# Patient Record
Sex: MALE | Race: WHITE | NOT HISPANIC OR LATINO | URBAN - METROPOLITAN AREA
[De-identification: names, ages, dates, MRNs, and addresses within clinical notes are randomized per-mention and may not be internally consistent; named-entity substitution may affect disease eponyms.]

---

## 2018-06-18 ENCOUNTER — TRANSCRIBE ORDERS (OUTPATIENT)
Dept: PHYSICAL THERAPY | Facility: CLINIC | Age: 63
End: 2018-06-18
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
## 2018-06-18 DIAGNOSIS — S16.1XXA STRAIN OF NECK MUSCLE, INITIAL ENCOUNTER: ICD-10-CM

## 2018-06-18 DIAGNOSIS — S23.3XXA SPRAIN OF LIGAMENTS OF THORACIC SPINE, INITIAL ENCOUNTER: ICD-10-CM
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
## 2018-06-18 DIAGNOSIS — S46.912A STRAIN OF UPPER ARM, LEFT, INITIAL ENCOUNTER: Primary | ICD-10-CM

## 2018-08-20 ENCOUNTER — OFFICE VISIT (OUTPATIENT)
Dept: ORTHOPEDIC SURGERY | Facility: CLINIC | Age: 63
End: 2018-08-20

## 2018-08-20 VITALS — OXYGEN SATURATION: 98 % | HEIGHT: 66 IN | HEART RATE: 95 BPM | BODY MASS INDEX: 22.53 KG/M2 | WEIGHT: 140.21 LBS

## 2018-08-20 DIAGNOSIS — M25.512 ACUTE PAIN OF LEFT SHOULDER: Primary | ICD-10-CM

## 2018-08-20 DIAGNOSIS — S46.012A RUPTURE OF LEFT SUPRASPINATUS TENDON, INITIAL ENCOUNTER: ICD-10-CM

## 2018-08-20 PROCEDURE — 99406 BEHAV CHNG SMOKING 3-10 MIN: CPT | Performed by: ORTHOPAEDIC SURGERY

## 2018-08-20 PROCEDURE — 99204 OFFICE O/P NEW MOD 45 MIN: CPT | Performed by: ORTHOPAEDIC SURGERY

## 2018-08-20 RX ORDER — IBUPROFEN 200 MG
200 TABLET ORAL EVERY 6 HOURS PRN
COMMUNITY

## 2018-08-20 NOTE — PROGRESS NOTES
[facility] [place]    Subjective     Chief Complaint   Patient presents with   • Left Shoulder - Pain     Injured at work 08/2017        HPI      Sana Braun is a 62 y.o. male.  She complains of left shoulder pain.  She injured her shoulder at work August 2017.  She has pain in left shoulder and arm with numbness in her left hand.  It is worse with driving lifting and reaching.  She feels better with rest.  Pain is 7 out of 10.  She has been to physical therapy.  She has tried ibuprofen.        History reviewed. No pertinent past medical history.   Past Surgical History:   Procedure Laterality Date   • HYSTERECTOMY        Family History   Problem Relation Age of Onset   • Hypertension Mother    • Stroke Father      Social History     Social History   • Marital status: Unknown     Spouse name: N/A   • Number of children: N/A   • Years of education: N/A     Occupational History   • Not on file.     Social History Main Topics   • Smoking status: Current Some Day Smoker   • Smokeless tobacco: Never Used   • Alcohol use Yes   • Drug use: No   • Sexual activity: Defer     Other Topics Concern   • Not on file     Social History Narrative   • No narrative on file      No current outpatient prescriptions on file prior to visit.     No current facility-administered medications on file prior to visit.       No Known Allergies     The following portions of the patient's history were reviewed and updated as appropriate: allergies, current medications, past family history, past medical history, past social history, past surgical history and problem list.    Review of Systems   Constitutional: Positive for activity change and fatigue.   HENT: Negative.    Eyes: Negative.    Respiratory: Negative.    Cardiovascular: Negative.    Gastrointestinal: Negative.    Endocrine: Negative.    Genitourinary: Negative.    Musculoskeletal: Positive for arthralgias, neck pain and neck stiffness.   Skin: Negative.   "  Allergic/Immunologic: Negative.    Neurological: Negative.    Hematological: Negative.    Psychiatric/Behavioral: Positive for sleep disturbance.        Objective      Physical Exam  Pulse 95   Ht 167.6 cm (66\")   Wt 63.6 kg (140 lb 3.4 oz)   SpO2 98%   BMI 22.63 kg/m²     Body mass index is 22.63 kg/m².        GENERAL APPEARANCE: awake, alert & oriented x 3, in no acute distress and well developed, well nourished  PSYCH: normal mood and affect  LUNGS:  breathing nonlabored, no wheezing  EYES: sclera anicteric, pupils equal  CARDIOVASCULAR: palpable pulses dorsalis pedis, palpable posterior tibial bilaterally. Capillary refill less than 2 seconds  INTEGUMENTARY: skin intact, no clubbing, cyanosis  NEUROLOGIC:  Normal Sensation and reflexes, subjective decrease in sensation at times in the left hand           Ortho Exam  Musculoskeletal   Upper Extremity   Left Shoulder     Inspection and Palpation:     Tenderness - none     Crepitus - none    Sensation is normal    Examination reveals no ecchymosis.      Strength and Tone:    Supraspinatus, Infraspinatus - 4/5    Subscapularis - 5/5    Deltoid - 5/5   Range of Motion   Right shoulder:    Internal Rotation: ROM - T7    External Rotation: AROM - 80 degrees    Elevation through flexion: AROM - 180 degrees     Abduction - 180   Left Shoulder:    Internal Rotation: ROM - T7    External Rotation: AROM - 80 degrees    Elevation through flexion: AROM - 90 degrees     Abduction - 90 degrees   Instability   Left shoulder    Sulcus sign negative    Apprehension test negative    Jelani relocation test negative    Jerk test negative   Impingement   Left shoulder    Carter impingement test positive    Neer impingement test positive   Functional Testing   Left shoulder    AC crossover adduction test negative    Abdominal compression test negative    Lift-off sign negative    Speed's test negative    Navarro's test negative    Horriblower's sign negative "     Imaging/Studies  Imaging Results (last 7 days)     Procedure Component Value Units Date/Time    XR Shoulder 2+ View Left [[id number]] Resulted:  08/20/18 1016     Updated:  08/20/18 1017    Narrative:       Left Shoulder X-Ray  Indication: Pain  AP, scapular Y, and axillary lateral views    Findings: Increased ossification about greater tuberosity consistent with   possible old healed fracture or calcific tendinitis  No fracture  No bony lesion  Normal soft tissues  Normal joint spaces    No prior studies were available for comparison.          I reviewed the MRI report which shows a small full-thickness tear of the supraspinatus.  However I do not have these images to review myself and she will bring the disc back tomorrow for me to see the pictures    Assessment/Plan        ICD-10-CM ICD-9-CM   1. Acute pain of left shoulder M25.512 719.41   2. Rupture of left supraspinatus tendon, initial encounter S46.012A 840.6       Orders Placed This Encounter   Procedures   • XR Shoulder 2+ View Left      The plan will be for a left shoulder arthroscopy with rotator cuff repair.  She'll continue restrictions of no use left arm or off work.  She will bring the disc for me to review her MRI tomorrow.Treatment options and alternatives were discussed with patient.  Surgical versus non surgical treatment options were discussed as well.    We reviewed the nature of the planned procedure including the risks, benefits and potential complications.  Understanding was expressed and the decision was made to proceed with surgery.  I discussed the importance of smoking cessation.  I spent 3 minutes with the patient discussing smoking cessation. I spent approximately 3 minutes counseling the patient regarding the adverse effects of tobacco use.  Included in this counseling session were treatment options for cessation including quitting cold turkey, medication, nicotine step-down programs, and smoking cessation programs.  Furthermore, I  explained to the patient the importance of abstaining from nicotine usage as nicotine is known to increase the risk of complications associated with surgery including but not limited to infection, decreased wound healing, slowed soft tissue healing, and increased surgery associated pain. I explained the risk of smoking, including hardening of the arteries.    Medical Decision Making  Management Options : over-the-counter medicine and major surgery with risk factors  Data/Risk: radiology tests and independent visualization of imaging, lab tests, or EMG/NCV    Tony Chavez MD  08/20/18  10:29 AM         EMR Dragon/Transcription disclaimer:  Much of this encounter note is an electronic transcription of spoken language to printed text. Electronic transcription of spoken language may permit erroneous, or at times, nonsensical words or phrases to be inadvertently transcribed. Although I have reviewed the note for such errors, some may still exist.

## 2018-09-12 ENCOUNTER — TELEPHONE (OUTPATIENT)
Dept: ORTHOPEDIC SURGERY | Facility: CLINIC | Age: 63
End: 2018-09-12

## 2018-09-12 NOTE — TELEPHONE ENCOUNTER
CALLED PATIENT TO ADVISE OF 6:30AM ARRIVAL TIME FOR SURGERY ON 9/13/18 WITH DR. FARMER. NO ANSWER, LEFT VOICEMAIL WITH DETAILS AND REQUEST TO RETURN CALL CONFIRMING RECEIPT OF MESSAGE.

## 2018-09-13 ENCOUNTER — OUTSIDE FACILITY SERVICE (OUTPATIENT)
Dept: ORTHOPEDIC SURGERY | Facility: CLINIC | Age: 63
End: 2018-09-13

## 2018-09-13 PROCEDURE — 29827 SHO ARTHRS SRG RT8TR CUF RPR: CPT | Performed by: ORTHOPAEDIC SURGERY

## 2018-09-13 PROCEDURE — 29828 SHO ARTHRS SRG BICP TENODSIS: CPT | Performed by: ORTHOPAEDIC SURGERY

## 2018-09-24 ENCOUNTER — OFFICE VISIT (OUTPATIENT)
Dept: ORTHOPEDIC SURGERY | Facility: CLINIC | Age: 63
End: 2018-09-24

## 2018-09-24 DIAGNOSIS — Z98.890 STATUS POST LEFT ROTATOR CUFF REPAIR: Primary | ICD-10-CM

## 2018-09-24 PROCEDURE — 99024 POSTOP FOLLOW-UP VISIT: CPT | Performed by: ORTHOPAEDIC SURGERY

## 2018-09-24 NOTE — PROGRESS NOTES
Chief Complaint   Patient presents with   • Post-op     2 weeks status post left shoulder arthroscopy rotator cuff repair with biceps tenodesis 09/13/18           HPI    She is doing well follow-up left shoulder rotator cuff repair from September 13.    There were no vitals filed for this visit.      Physical Exam:  The portals look good.  She is neurovascular intact.      X-RAY REPORT:  Imaging Results (last 7 days)     ** No results found for the last 168 hours. **                ICD-10-CM ICD-9-CM   1. Status post left rotator cuff repair Z98.890 V45.89     She's doing well.  She'll continue the sling.  She'll follow-up in 2 weeks to start physical therapy.  She's work restrictions no use left arm.

## 2018-10-10 ENCOUNTER — OFFICE VISIT (OUTPATIENT)
Dept: ORTHOPEDIC SURGERY | Facility: CLINIC | Age: 63
End: 2018-10-10

## 2018-10-10 DIAGNOSIS — Z98.890 STATUS POST LEFT ROTATOR CUFF REPAIR: Primary | ICD-10-CM

## 2018-10-10 DIAGNOSIS — Z02.6 ENCOUNTER RELATED TO WORKER'S COMPENSATION CLAIM: ICD-10-CM

## 2018-10-10 PROCEDURE — 99024 POSTOP FOLLOW-UP VISIT: CPT | Performed by: ORTHOPAEDIC SURGERY

## 2018-10-10 NOTE — PROGRESS NOTES
Chief Complaint   Patient presents with   • Post-op     2 week f/u; 3 weeks status post Left Shoulder Arthroscopy Rotator Cuff Repair with Biceps Tenodesis 09/13/18           HPI  She's doing well one month out from left shoulder rotator cuff repair.  She is worker's comp.      There were no vitals filed for this visit.      Physical Exam:    Her portals look good.  She is neurovascular intact.        ICD-10-CM ICD-9-CM   1. Status post left rotator cuff repair Z98.890 V45.89   2. Encounter related to worker's compensation claim Z02.6 V70.3       Orders Placed This Encounter   Procedures   • Ambulatory Referral to Physical Therapy   She will start physical therapy.  She is on work restrictions no use left arm.  She'll follow-up in one month.

## 2018-11-14 ENCOUNTER — OFFICE VISIT (OUTPATIENT)
Dept: ORTHOPEDIC SURGERY | Facility: CLINIC | Age: 63
End: 2018-11-14

## 2018-11-14 DIAGNOSIS — M75.02 ADHESIVE CAPSULITIS OF LEFT SHOULDER: ICD-10-CM

## 2018-11-14 DIAGNOSIS — Z98.890 STATUS POST LEFT ROTATOR CUFF REPAIR: Primary | ICD-10-CM

## 2018-11-14 DIAGNOSIS — Z02.6 ENCOUNTER RELATED TO WORKER'S COMPENSATION CLAIM: ICD-10-CM

## 2018-11-14 PROCEDURE — 99024 POSTOP FOLLOW-UP VISIT: CPT | Performed by: ORTHOPAEDIC SURGERY

## 2018-11-14 NOTE — PROGRESS NOTES
Chief Complaint   Patient presents with   • Post-op     5 weeks - status post Left Shoulder Arthroscopy Rotator Cuff Repair with Biceps Tenodesis 09/13/18           HPI  She is follow-up left shoulder cuff repair from September 13.  She is not yet been to physical therapy.  Worker's Comp. has delayed itand she does not start until next week.  Therefore she's having increasing pain and stiffness.      There were no vitals filed for this visit.      Physical Exam:  She has very limited for flexion and abduction less than 20°.  She is distally neurovascularly intact.      X-RAY REPORT:  Imaging Results (last 7 days)     ** No results found for the last 168 hours. **                ICD-10-CM ICD-9-CM   1. Status post left rotator cuff repair Z98.890 V45.89   2. Encounter related to worker's compensation claim Z02.6 V70.3   3. Adhesive capsulitis of left shoulder M75.02 726.0       Orders Placed This Encounter   Procedures   • Ambulatory Referral to Physical Therapy   She desperately needs physical therapy.  She will continue anti-inflammatories.  She'll follow-up in one month.  She is work restrictions no use left arm.

## 2018-12-03 ENCOUNTER — TELEPHONE (OUTPATIENT)
Dept: ORTHOPEDIC SURGERY | Facility: CLINIC | Age: 63
End: 2018-12-03

## 2018-12-03 NOTE — TELEPHONE ENCOUNTER
I left a voice message and let the patient know that I faxed her therapy order to the number she left fax #[phone].

## 2018-12-04 ENCOUNTER — TELEPHONE (OUTPATIENT)
Dept: ORTHOPEDIC SURGERY | Facility: CLINIC | Age: 63
End: 2018-12-04

## 2018-12-04 DIAGNOSIS — Z98.890 H/O REPAIR OF LEFT ROTATOR CUFF: Primary | ICD-10-CM

## 2018-12-04 NOTE — TELEPHONE ENCOUNTER
PATIENT CALLED REQUESTING NEW PT ORDER, SHE IS CURRENTLY OUT OF VISITS. SHE NEEDS IT TO START ON 12/7 NOT 11/14. CAN CALL [phone]. FAX [phone].

## 2018-12-19 ENCOUNTER — OFFICE VISIT (OUTPATIENT)
Dept: ORTHOPEDIC SURGERY | Facility: CLINIC | Age: 63
End: 2018-12-19

## 2018-12-19 VITALS — OXYGEN SATURATION: 99 % | WEIGHT: 147.49 LBS | HEIGHT: 66 IN | HEART RATE: 112 BPM | BODY MASS INDEX: 23.7 KG/M2

## 2018-12-19 DIAGNOSIS — Z02.6 ENCOUNTER RELATED TO WORKER'S COMPENSATION CLAIM: ICD-10-CM

## 2018-12-19 DIAGNOSIS — Z98.890 H/O REPAIR OF LEFT ROTATOR CUFF: Primary | ICD-10-CM

## 2018-12-19 DIAGNOSIS — M75.02 ADHESIVE CAPSULITIS OF LEFT SHOULDER: ICD-10-CM

## 2018-12-19 PROCEDURE — 99213 OFFICE O/P EST LOW 20 MIN: CPT | Performed by: ORTHOPAEDIC SURGERY

## 2018-12-19 RX ORDER — FLUTICASONE PROPIONATE 50 MCG
1 SPRAY, SUSPENSION (ML) NASAL 2 TIMES DAILY
Refills: 0 | COMMUNITY
Start: 2018-12-03

## 2018-12-19 RX ORDER — CEFDINIR 300 MG/1
CAPSULE ORAL
Refills: 0 | COMMUNITY
Start: 2018-12-03 | End: 2019-04-24

## 2018-12-19 NOTE — PROGRESS NOTES
[facility] [place]    Subjective     Chief Complaint   Patient presents with   • Left Shoulder - Follow-up     Left Shoulder Arthroscopy Rotator Cuff Repair with Biceps Tenodesis 09/13/18  1 month f/u        HPI      Sana Braun is a 63 y.o. male.  Had a left shoulder cuff repair with biceps tenodesis September 13.  She only got 6 sessions of physical therapy.  Her Worker's Comp. is then completely unreasonable.  They finally approved therapy she had 6 sessions and then none and resumed therapy yesterday.  I reviewed the notes from physical therapist and she regressed because she was unable to get therapy for a significant period of time. In my 16 years of practice I have  never had a Worker's Comp. reapprove 6 sessions of physical therapy        History reviewed. No pertinent past medical history.   Past Surgical History:   Procedure Laterality Date   • HYSTERECTOMY     • SHOULDER SURGERY Left 09/13/2018    Left shoulder arthroscopy with rotator cuff repair and biceps tenodesis ; Dr. Tony Chavez, Orthopedic Surgery      Family History   Problem Relation Age of Onset   • Hypertension Mother    • Stroke Father      Social History     Socioeconomic History   • Marital status: Unknown     Spouse name: Not on file   • Number of children: Not on file   • Years of education: Not on file   • Highest education level: Not on file   Social Needs   • Financial resource strain: Not on file   • Food insecurity - worry: Not on file   • Food insecurity - inability: Not on file   • Transportation needs - medical: Not on file   • Transportation needs - non-medical: Not on file   Occupational History   • Not on file   Tobacco Use   • Smoking status: Current Some Day Smoker   • Smokeless tobacco: Never Used   Substance and Sexual Activity   • Alcohol use: Yes   • Drug use: No   • Sexual activity: Defer   Other Topics Concern   • Not on file   Social History Narrative   • Not on file      Current  "Outpatient Medications on File Prior to Visit   Medication Sig Dispense Refill   • cefdinir (OMNICEF) 300 MG capsule take 1 capsule by mouth twice a day for 10 days  0   • Etanercept (ENBREL) 25 MG reconstituted solution Inject 5 mL under the skin into the appropriate area as directed.     • fluticasone (FLONASE) 50 MCG/ACT nasal spray 1 spray by Each Nare route 2 (Two) Times a Day.  0   • ibuprofen (ADVIL,MOTRIN) 200 MG tablet Take 200 mg by mouth Every 6 (Six) Hours As Needed for Mild Pain .       No current facility-administered medications on file prior to visit.       No Known Allergies     The following portions of the patient's history were reviewed and updated as appropriate: allergies, current medications, past family history, past medical history, past social history, past surgical history and problem list.    Review of Systems   Constitutional: Negative.    HENT: Negative.    Eyes: Negative.    Respiratory: Negative.    Cardiovascular: Negative.    Gastrointestinal: Negative.    Endocrine: Negative.    Genitourinary: Negative.    Musculoskeletal: Positive for back pain, joint swelling, neck pain and neck stiffness.   Skin: Negative.    Allergic/Immunologic: Negative.    Neurological: Negative.    Hematological: Negative.    Psychiatric/Behavioral: Negative.         Objective      Physical Exam  Pulse 112   Ht 167.6 cm (65.98\")   Wt 66.9 kg (147 lb 7.8 oz)   SpO2 99%   BMI 23.82 kg/m²     Body mass index is 23.82 kg/m².        GENERAL APPEARANCE: awake, alert & oriented x 3, in no acute distress and well developed, well nourished  PSYCH: normal mood and affect        Ortho Exam  Left shoulder passive range of motion 110° I 5° abduction 65° external rotation 70° internal rotation active motion 82° flexion and 48° abduction    Imaging/Studies  Imaging Results (last 7 days)     ** No results found for the last 168 hours. **          Assessment/Plan        ICD-10-CM ICD-9-CM   1. H/O repair of left rotator " cuff Z98.890 V15.29   2. Encounter related to worker's compensation claim Z02.6 V70.3   3. Adhesive capsulitis of left shoulder M75.02 726.0       Orders Placed This Encounter   Procedures   • Ambulatory Referral to Physical Therapy    I have ordered more physical therapy.  She needs 3 times a week for probably 12 weeks at this point.  She was made worse by the fact she was unable to get physical therapy.  She has regressed.  She has a frozen shoulder now.  Her work restriction is no use left arm.  I'll see her back in 1 month.  I reviewed the notes from her physical therapist dated 12/18 and in summary the patient has regressed.    Medical Decision Making  Management Options : over-the-counter medicine and physical/occupational therapy  Data/Risk: radiology tests, summary of old records and independent visualization of imaging, lab tests, or EMG/NCV    Tony Chavez MD  12/19/18  4:10 PM         EMR Dragon/Transcription disclaimer:  Much of this encounter note is an electronic transcription of spoken language to printed text. Electronic transcription of spoken language may permit erroneous, or at times, nonsensical words or phrases to be inadvertently transcribed. Although I have reviewed the note for such errors, some may still exist.

## 2019-01-14 ENCOUNTER — OFFICE VISIT (OUTPATIENT)
Dept: ORTHOPEDIC SURGERY | Facility: CLINIC | Age: 64
End: 2019-01-14

## 2019-01-14 VITALS — HEIGHT: 66 IN | OXYGEN SATURATION: 99 % | BODY MASS INDEX: 24.55 KG/M2 | WEIGHT: 152.78 LBS | HEART RATE: 103 BPM

## 2019-01-14 DIAGNOSIS — Z02.6 ENCOUNTER RELATED TO WORKER'S COMPENSATION CLAIM: ICD-10-CM

## 2019-01-14 DIAGNOSIS — Z98.890 H/O REPAIR OF LEFT ROTATOR CUFF: Primary | ICD-10-CM

## 2019-01-14 DIAGNOSIS — M75.02 ADHESIVE CAPSULITIS OF LEFT SHOULDER: ICD-10-CM

## 2019-01-14 PROCEDURE — 99213 OFFICE O/P EST LOW 20 MIN: CPT | Performed by: ORTHOPAEDIC SURGERY

## 2019-01-14 NOTE — PROGRESS NOTES
[facility] [place] [facility]    Subjective     Chief Complaint   Patient presents with   • Left Shoulder - Follow-up     H/O Repair of Left Rotator Cuff  1 month f/u        HPI      Sana Braun is a 63 y.o. male.  She is follow-up left shoulder cuff repair with biceps tenodesis from September 13, 2018.  She had a delay in physical therapy.  [place]'s [place].  There have been issues.  I have records that are reviewed from her physical therapist.        History reviewed. No pertinent past medical history.   Past Surgical History:   Procedure Laterality Date   • HYSTERECTOMY     • SHOULDER SURGERY Left 09/13/2018    Left shoulder arthroscopy with rotator cuff repair and biceps tenodesis ; Dr. Tony Chavez, Orthopedic Surgery      Family History   Problem Relation Age of Onset   • Hypertension Mother    • Stroke Father      Social History     Socioeconomic History   • Marital status: Unknown     Spouse name: Not on file   • Number of children: Not on file   • Years of education: Not on file   • Highest education level: Not on file   Social Needs   • Financial resource strain: Not on file   • Food insecurity - worry: Not on file   • Food insecurity - inability: Not on file   • Transportation needs - medical: Not on file   • Transportation needs - non-medical: Not on file   Occupational History   • Not on file   Tobacco Use   • Smoking status: Current Some Day Smoker   • Smokeless tobacco: Never Used   Substance and Sexual Activity   • Alcohol use: Yes   • Drug use: No   • Sexual activity: Defer   Other Topics Concern   • Not on file   Social History Narrative   • Not on file      Current Outpatient Medications on File Prior to Visit   Medication Sig Dispense Refill   • cefdinir (OMNICEF) 300 MG capsule take 1 capsule by mouth twice a day for 10 days  0   • Etanercept (ENBREL) 25 MG reconstituted solution Inject 5 mL under the skin into the appropriate area as directed.     •  "fluticasone (FLONASE) 50 MCG/ACT nasal spray 1 spray by Each Nare route 2 (Two) Times a Day.  0   • ibuprofen (ADVIL,MOTRIN) 200 MG tablet Take 200 mg by mouth Every 6 (Six) Hours As Needed for Mild Pain .       No current facility-administered medications on file prior to visit.       No Known Allergies     The following portions of the patient's history were reviewed and updated as appropriate: allergies, current medications, past family history, past medical history, past social history, past surgical history and problem list.    Review of Systems   Constitutional: Negative.    HENT: Negative.    Eyes: Negative.    Respiratory: Negative.    Cardiovascular: Negative.    Gastrointestinal: Negative.    Endocrine: Negative.    Genitourinary: Negative.    Musculoskeletal: Positive for joint swelling.   Skin: Negative.    Allergic/Immunologic: Negative.    Neurological: Negative.    Hematological: Negative.    Psychiatric/Behavioral: Negative.         Objective      Physical Exam  Pulse 103   Ht 167.6 cm (65.98\")   Wt 69.3 kg (152 lb 12.5 oz)   SpO2 99%   BMI 24.67 kg/m²     Body mass index is 24.67 kg/m².        GENERAL APPEARANCE: awake, alert & oriented x 3, in no acute distress and well developed, well nourished  PSYCH: normal mood and affect      Ortho Exam  I agree with the readings from her physical therapist.  Her passive range of motion left shoulder 163° flexion 130° abduction 80 external rotation 80 internal rotation actively 113° less than 90° abduction    Imaging/Studies  Imaging Results (last 7 days)     ** No results found for the last 168 hours. **          Assessment/Plan        ICD-10-CM ICD-9-CM   1. H/O repair of left rotator cuff Z98.890 V15.29   2. Encounter related to worker's compensation claim Z02.6 V70.3   3. Adhesive capsulitis of left shoulder M75.02 726.0     She has 3 more weeks of physical therapy and I will see her back and then if she is not better we'll get an MRI.  I reviewed her " notes from [facility] dated January 14.  I summarized those in the physical exam portion of my note  Medical Decision Making  Management Options : over-the-counter medicine and physical/occupational therapy      Tony Chavez MD  01/14/19  4:08 PM         EMR Dragon/Transcription disclaimer:  Much of this encounter note is an electronic transcription of spoken language to printed text. Electronic transcription of spoken language may permit erroneous, or at times, nonsensical words or phrases to be inadvertently transcribed. Although I have reviewed the note for such errors, some may still exist.

## 2019-02-11 ENCOUNTER — OFFICE VISIT (OUTPATIENT)
Dept: ORTHOPEDIC SURGERY | Facility: CLINIC | Age: 64
End: 2019-02-11

## 2019-02-11 VITALS — HEART RATE: 111 BPM | OXYGEN SATURATION: 90 % | HEIGHT: 66 IN | WEIGHT: 149.91 LBS | BODY MASS INDEX: 24.09 KG/M2

## 2019-02-11 DIAGNOSIS — Z02.6 ENCOUNTER RELATED TO WORKER'S COMPENSATION CLAIM: ICD-10-CM

## 2019-02-11 DIAGNOSIS — Z98.890 H/O REPAIR OF LEFT ROTATOR CUFF: Primary | ICD-10-CM

## 2019-02-11 DIAGNOSIS — M75.02 ADHESIVE CAPSULITIS OF LEFT SHOULDER: ICD-10-CM

## 2019-02-11 PROCEDURE — 99213 OFFICE O/P EST LOW 20 MIN: CPT | Performed by: ORTHOPAEDIC SURGERY

## 2019-02-11 NOTE — PROGRESS NOTES
[facility] [place]    Subjective     Chief Complaint   Patient presents with   • Left Shoulder - Follow-up     4 weeks        HPI      Sana Braun is a 63 y.o. male.  She is follow-up left shoulder cuff repair from September 13.  I reviewed her physical therapy report from February 4 and she has regressed in summary.  Her range of motion is only 100° flexion 55 abduction active        History reviewed. No pertinent past medical history.   Past Surgical History:   Procedure Laterality Date   • HYSTERECTOMY     • SHOULDER SURGERY Left 09/13/2018    Left shoulder arthroscopy with rotator cuff repair and biceps tenodesis ; Dr. Tony Chavez, Orthopedic Surgery      Family History   Problem Relation Age of Onset   • Hypertension Mother    • Stroke Father      Social History     Socioeconomic History   • Marital status: Unknown     Spouse name: Not on file   • Number of children: Not on file   • Years of education: Not on file   • Highest education level: Not on file   Social Needs   • Financial resource strain: Not on file   • Food insecurity - worry: Not on file   • Food insecurity - inability: Not on file   • Transportation needs - medical: Not on file   • Transportation needs - non-medical: Not on file   Occupational History   • Not on file   Tobacco Use   • Smoking status: Current Some Day Smoker   • Smokeless tobacco: Never Used   Substance and Sexual Activity   • Alcohol use: Yes   • Drug use: No   • Sexual activity: Defer   Other Topics Concern   • Not on file   Social History Narrative   • Not on file      Current Outpatient Medications on File Prior to Visit   Medication Sig Dispense Refill   • cefdinir (OMNICEF) 300 MG capsule take 1 capsule by mouth twice a day for 10 days  0   • Etanercept (ENBREL) 25 MG reconstituted solution Inject 5 mL under the skin into the appropriate area as directed.     • fluticasone (FLONASE) 50 MCG/ACT nasal spray 1 spray by Each Nare route 2 (Two)  "Times a Day.  0   • ibuprofen (ADVIL,MOTRIN) 200 MG tablet Take 200 mg by mouth Every 6 (Six) Hours As Needed for Mild Pain .       No current facility-administered medications on file prior to visit.       No Known Allergies     The following portions of the patient's history were reviewed and updated as appropriate: allergies, current medications, past family history, past medical history, past social history, past surgical history and problem list.    Review of Systems   Constitutional: Negative.    HENT: Negative.    Eyes: Negative.    Respiratory: Negative.    Cardiovascular: Negative.    Gastrointestinal: Negative.    Endocrine: Negative.    Genitourinary: Negative.    Musculoskeletal: Positive for arthralgias, neck pain and neck stiffness.   Skin: Negative.    Allergic/Immunologic: Negative.    Neurological: Negative.    Hematological: Negative.    Psychiatric/Behavioral: Negative.         Objective      Physical Exam  Pulse 111   Ht 167.6 cm (65.98\")   Wt 68 kg (149 lb 14.6 oz)   SpO2 90%   BMI 24.21 kg/m²     Body mass index is 24.21 kg/m².        GENERAL APPEARANCE: awake, alert & oriented x 3, in no acute distress and well developed, well nourished  PSYCH: normal mood and affect  LUNGS:  breathing nonlabored, no wheezing      Ortho Exam  Left shoulder active range of motion 100° flexion 55 abduction passive motion 128 flexion and 105 abduction 55° external rotation 75 internal rotation strength is 4-5    Imaging/Studies  Imaging Results (last 7 days)     ** No results found for the last 168 hours. **          Assessment/Plan        ICD-10-CM ICD-9-CM   1. H/O repair of left rotator cuff Z98.890 V15.29   2. Encounter related to worker's compensation claim Z02.6 V70.3   3. Adhesive capsulitis of left shoulder M75.02 726.0       Orders Placed This Encounter   Procedures   • Ambulatory Referral to Physical Therapy    She has regressed and gotten worse with her range of motion left shoulder.  There was a " [facility] physical therapy.  She had a Worker's Comp. authorization issues.  She has now been in a sessions of therapy and is getting worse.  Therefore we will get the MRI and I'll see her back.  Her work restrictions are no use left arm.    Medical Decision Making  Management Options : over-the-counter medicine and physical/occupational therapy  Data/Risk: summary of old records    Tony Chavez MD  02/11/19  3:49 PM         EMR Dragon/Transcription disclaimer:  Much of this encounter note is an electronic transcription of spoken language to printed text. Electronic transcription of spoken language may permit erroneous, or at times, nonsensical words or phrases to be inadvertently transcribed. Although I have reviewed the note for such errors, some may still exist.

## 2019-03-04 DIAGNOSIS — M25.512 CHRONIC LEFT SHOULDER PAIN: Primary | ICD-10-CM

## 2019-03-04 DIAGNOSIS — G89.29 CHRONIC LEFT SHOULDER PAIN: Primary | ICD-10-CM

## 2019-03-15 DIAGNOSIS — M25.512 CHRONIC LEFT SHOULDER PAIN: ICD-10-CM

## 2019-03-15 DIAGNOSIS — G89.29 CHRONIC LEFT SHOULDER PAIN: ICD-10-CM

## 2019-03-18 ENCOUNTER — OFFICE VISIT (OUTPATIENT)
Dept: ORTHOPEDIC SURGERY | Facility: CLINIC | Age: 64
End: 2019-03-18

## 2019-03-18 VITALS — WEIGHT: 149.91 LBS | OXYGEN SATURATION: 99 % | HEIGHT: 66 IN | BODY MASS INDEX: 24.09 KG/M2 | HEART RATE: 120 BPM

## 2019-03-18 DIAGNOSIS — Z02.6 ENCOUNTER RELATED TO WORKER'S COMPENSATION CLAIM: Primary | ICD-10-CM

## 2019-03-18 DIAGNOSIS — S46.012S RUPTURE OF LEFT SUPRASPINATUS TENDON, SEQUELA: ICD-10-CM

## 2019-03-18 DIAGNOSIS — F17.200 SMOKER UNMOTIVATED TO QUIT: ICD-10-CM

## 2019-03-18 DIAGNOSIS — Z98.890 STATUS POST LEFT ROTATOR CUFF REPAIR: ICD-10-CM

## 2019-03-18 DIAGNOSIS — M75.02 ADHESIVE CAPSULITIS OF LEFT SHOULDER: ICD-10-CM

## 2019-03-18 PROCEDURE — 99406 BEHAV CHNG SMOKING 3-10 MIN: CPT | Performed by: ORTHOPAEDIC SURGERY

## 2019-03-18 PROCEDURE — 99213 OFFICE O/P EST LOW 20 MIN: CPT | Performed by: ORTHOPAEDIC SURGERY

## 2019-03-18 NOTE — PROGRESS NOTES
[facility] [place] [facility] Note    Subjective     Chief Complaint   Patient presents with   • Follow-up     Left shoulder MRI 3-14-19        HPI      Sana Braun is a 63 y.o. male.  She is follow-up left shoulder.  She had rotator cuff repair in September.  She started getting worse and physical therapy in February.  She had a recent MRI.  I have the physical therapy report from [place] dated March 1 which says she has regressed.  Her passive motion is 165 degrees flexion 120 degrees abduction.  Her pain is constant.  She is workers comp.        History reviewed. No pertinent past medical history.   Past Surgical History:   Procedure Laterality Date   • HYSTERECTOMY     • SHOULDER SURGERY Left 09/13/2018    Left shoulder arthroscopy with rotator cuff repair and biceps tenodesis ; Dr. Tony Chavez, Orthopedic Surgery      Family History   Problem Relation Age of Onset   • Hypertension Mother    • Stroke Father      Social History     Socioeconomic History   • Marital status: Unknown     Spouse name: Not on file   • Number of children: Not on file   • Years of education: Not on file   • Highest education level: Not on file   Social Needs   • Financial resource strain: Not on file   • Food insecurity - worry: Not on file   • Food insecurity - inability: Not on file   • Transportation needs - medical: Not on file   • Transportation needs - non-medical: Not on file   Occupational History   • Not on file   Tobacco Use   • Smoking status: Current Some Day Smoker   • Smokeless tobacco: Never Used   Substance and Sexual Activity   • Alcohol use: Yes   • Drug use: No   • Sexual activity: Defer   Other Topics Concern   • Not on file   Social History Narrative   • Not on file      Current Outpatient Medications on File Prior to Visit   Medication Sig Dispense Refill   • cefdinir (OMNICEF) 300 MG capsule take 1 capsule by mouth twice a day for 10 days  0   • Etanercept (ENBREL) 25 MG reconstituted solution  "Inject 5 mL under the skin into the appropriate area as directed.     • fluticasone (FLONASE) 50 MCG/ACT nasal spray 1 spray by Each Nare route 2 (Two) Times a Day.  0   • ibuprofen (ADVIL,MOTRIN) 200 MG tablet Take 200 mg by mouth Every 6 (Six) Hours As Needed for Mild Pain .       No current facility-administered medications on file prior to visit.       No Known Allergies     The following portions of the patient's history were reviewed and updated as appropriate: allergies, current medications, past family history, past medical history, past social history, past surgical history and problem list.    Review of Systems   Constitutional: Negative.    HENT: Negative.    Eyes: Negative.    Respiratory: Negative.    Cardiovascular: Negative.    Gastrointestinal: Negative.    Endocrine: Negative.    Genitourinary: Negative.    Musculoskeletal: Positive for arthralgias (shoulder pain).   Skin: Negative.    Allergic/Immunologic: Negative.    Neurological: Negative.    Hematological: Negative.    Psychiatric/Behavioral: Negative.         Objective      Physical Exam  Pulse 120   Ht 167.6 cm (65.98\")   Wt 68 kg (149 lb 14.6 oz)   SpO2 99%   BMI 24.21 kg/m²     Body mass index is 24.21 kg/m².        GENERAL APPEARANCE: awake, alert & oriented x 3, in no acute distress and well developed, well nourished  PSYCH: normal mood and affect    Ortho Exam  Left shoulder rotator cuff is weak.  She lacks motion.  Active motion is 170 degrees flexion 58 abduction passive 165 and 120.  She is distally neurovascular intact.    Imaging/Studies  Imaging Results (last 7 days)     ** No results found for the last 168 hours. **      I do not have the images but the MRI report from March 14 shows a full-thickness rotator cuff tear similar to prior exam    Assessment/Plan        ICD-10-CM ICD-9-CM   1. Encounter related to worker's compensation claim Z02.6 V70.3   2. Status post left rotator cuff repair Z98.890 V45.89   3. Adhesive " capsulitis of left shoulder M75.02 726.0   4. Rupture of left supraspinatus tendon, sequela S46.012S 905.8   5. Smoker unmotivated to quit F17.200 305.1       Orders Placed This Encounter   Procedures   • Ambulatory Referral to Physical Therapy      I would recommend revision left shoulder rotator cuff repair because she is still symptomatic.  She has a high failure rate risk because she is a smoker and 63 years of age.  She failed previous surgery.  She wants to think about it.  She will continue restrictions no use left arm.  She will follow-up in 1 month.  I spent 3 minutes with the patient discussing smoking cessation. I spent approximately 3 minutes counseling the patient regarding the adverse effects of tobacco use.  Included in this counseling session were treatment options for cessation including quitting cold turkey, medication, nicotine step-down programs, and smoking cessation programs.  Furthermore, I explained to the patient the importance of abstaining from nicotine usage as nicotine is known to increase the risk of complications associated with surgery including but not limited to infection, decreased wound healing, slowed soft tissue healing, and increased surgery associated pain. I explained the risk of smoking, including hardening of the arteries,  gangrene, amputation.  I explained smoking poisons bone cells and increases the risk of nonunion of fractures and fusions.  I explained that smokers are also an increased risk of tendinitis and it is more difficult to treat and resolve.  Medical Decision Making  Management Options : over-the-counter medicine and physical/occupational therapy  Data/Risk: radiology tests    Tony Chavez MD  03/18/19  3:19 PM         EMR Dragon/Transcription disclaimer:  Much of this encounter note is an electronic transcription of spoken language to printed text. Electronic transcription of spoken language may permit erroneous, or at times, nonsensical words or  phrases to be inadvertently transcribed. Although I have reviewed the note for such errors, some may still exist.

## 2019-04-24 ENCOUNTER — OFFICE VISIT (OUTPATIENT)
Dept: ORTHOPEDIC SURGERY | Facility: CLINIC | Age: 64
End: 2019-04-24

## 2019-04-24 VITALS — HEART RATE: 115 BPM | WEIGHT: 156.53 LBS | HEIGHT: 66 IN | BODY MASS INDEX: 25.16 KG/M2 | OXYGEN SATURATION: 99 %

## 2019-04-24 DIAGNOSIS — M75.02 ADHESIVE CAPSULITIS OF LEFT SHOULDER: ICD-10-CM

## 2019-04-24 DIAGNOSIS — Z02.6 ENCOUNTER RELATED TO WORKER'S COMPENSATION CLAIM: Primary | ICD-10-CM

## 2019-04-24 DIAGNOSIS — Z98.890 STATUS POST LEFT ROTATOR CUFF REPAIR: ICD-10-CM

## 2019-04-24 PROCEDURE — 99214 OFFICE O/P EST MOD 30 MIN: CPT | Performed by: ORTHOPAEDIC SURGERY

## 2019-04-24 NOTE — PROGRESS NOTES
[facility] [place] [facility] Note    Subjective     Chief Complaint   Patient presents with   • Left Shoulder - Follow-up     5 week f/u  Adhesive capsulitis of left shoulder;Rupture of left supraspinatus tendon             HPI      Sana Braun is a 63 y.o. male.  She is follow-up left shoulder.  She is not getting better physical therapy.  She is here to discuss possible surgery.  She is here with a .  I reviewed her physical therapy notes from April 15 which described active motion 90 degrees flexion 65 abduction and standing passive motion 120 flexion 104 abduction 65 degrees external rotation and 70 degrees internal rotation        History reviewed. No pertinent past medical history.   Past Surgical History:   Procedure Laterality Date   • HYSTERECTOMY     • SHOULDER SURGERY Left 09/13/2018    Left shoulder arthroscopy with rotator cuff repair and biceps tenodesis ; Dr. Tony Chavez, Orthopedic Surgery      Family History   Problem Relation Age of Onset   • Hypertension Mother    • Stroke Father      Social History     Socioeconomic History   • Marital status: Unknown     Spouse name: Not on file   • Number of children: Not on file   • Years of education: Not on file   • Highest education level: Not on file   Tobacco Use   • Smoking status: Current Some Day Smoker   • Smokeless tobacco: Never Used   Substance and Sexual Activity   • Alcohol use: Yes   • Drug use: No   • Sexual activity: Defer      Current Outpatient Medications on File Prior to Visit   Medication Sig Dispense Refill   • Etanercept (ENBREL) 25 MG reconstituted solution Inject 5 mL under the skin into the appropriate area as directed.     • fluticasone (FLONASE) 50 MCG/ACT nasal spray 1 spray by Each Nare route 2 (Two) Times a Day.  0   • ibuprofen (ADVIL,MOTRIN) 200 MG tablet Take 200 mg by mouth Every 6 (Six) Hours As Needed for Mild Pain .     • [DISCONTINUED] cefdinir (OMNICEF) 300 MG capsule take 1 capsule by  "mouth twice a day for 10 days  0     No current facility-administered medications on file prior to visit.       No Known Allergies     The following portions of the patient's history were reviewed and updated as appropriate: allergies, current medications, past family history, past medical history, past social history, past surgical history and problem list.    Review of Systems   Constitutional: Negative.    HENT: Negative.    Eyes: Negative.    Respiratory: Negative.    Cardiovascular: Negative.    Gastrointestinal: Negative.    Endocrine: Negative.    Genitourinary: Negative.    Musculoskeletal: Positive for arthralgias.   Skin: Negative.    Allergic/Immunologic: Negative.    Neurological: Negative.    Hematological: Negative.    Psychiatric/Behavioral: Positive for agitation and sleep disturbance.        Objective      Physical Exam  Pulse 115   Ht 167.6 cm (65.98\")   Wt 71 kg (156 lb 8.4 oz)   SpO2 99%   BMI 25.28 kg/m²     Body mass index is 25.28 kg/m².        GENERAL APPEARANCE: awake, alert & oriented x 3, in no acute distress and well developed, well nourished  PSYCH: normal mood and affect        Ortho Exam  active motion 90 degrees flexion 65 abduction and standing passive motion 120 flexion 104 abduction 65 degrees external rotation and 70 degrees internal rotation      Imaging/Studies  Imaging Results (last 7 days)     ** No results found for the last 168 hours. **          Assessment/Plan        ICD-10-CM ICD-9-CM   1. Encounter related to worker's compensation claim Z02.6 V70.3   2. Status post left rotator cuff repair Z98.890 V45.89   3. Adhesive capsulitis of left shoulder M75.02 726.0     I recommended revision left shoulder rotator cuff repair.  She still symptomatic.  She does have a high failure rate because she is a smoker and 63 years of age.  She failed previous surgery.  She did not get physical therapy for over 6 weeks post surgery because of a Worker's Comp. issue.  That issue has " been resolved.Treatment options and alternatives were discussed with patient and family.  Surgical versus non surgical treatment options were discussed as well.    We reviewed the nature of the planned procedure including the risks, benefits and potential complications.  Understanding was expressed and the decision was made to proceed with surgery.    Medical Decision Making  Management Options : over-the-counter medicine and major surgery with risk factors      Tony Chavez MD  04/24/19  4:15 PM         EMR Dragon/Transcription disclaimer:  Much of this encounter note is an electronic transcription of spoken language to printed text. Electronic transcription of spoken language may permit erroneous, or at times, nonsensical words or phrases to be inadvertently transcribed. Although I have reviewed the note for such errors, some may still exist.

## 2020-01-21 ENCOUNTER — TELEPHONE (OUTPATIENT)
Dept: ORTHOPEDIC SURGERY | Facility: CLINIC | Age: 65
End: 2020-01-21

## 2020-01-21 NOTE — TELEPHONE ENCOUNTER
SOURAV [employer] Senior Claims Rep, Africa Saleem. We had rec'd MMI PPI request from [employer] and per TCW we cannot complete unless pt is seen again. Pt last seen on 4/24, and also has seen Dr. Trey Wood after seeing us. I communicated this to Africa. She is going to see if pt is willing to see TCW again so that this form can be completed for [employer]. If she is willing, Africa will call back to schedule appt. Anyone can schedule this appt.